# Patient Record
Sex: FEMALE | Race: WHITE | NOT HISPANIC OR LATINO | Employment: PART TIME | URBAN - METROPOLITAN AREA
[De-identification: names, ages, dates, MRNs, and addresses within clinical notes are randomized per-mention and may not be internally consistent; named-entity substitution may affect disease eponyms.]

---

## 2017-08-31 ENCOUNTER — OFFICE VISIT (OUTPATIENT)
Dept: OBSTETRICS AND GYNECOLOGY | Facility: CLINIC | Age: 52
End: 2017-08-31
Payer: COMMERCIAL

## 2017-08-31 VITALS
HEIGHT: 62 IN | SYSTOLIC BLOOD PRESSURE: 138 MMHG | WEIGHT: 151 LBS | DIASTOLIC BLOOD PRESSURE: 82 MMHG | BODY MASS INDEX: 27.79 KG/M2

## 2017-08-31 DIAGNOSIS — Z11.51 SPECIAL SCREENING EXAMINATION FOR HUMAN PAPILLOMAVIRUS (HPV): ICD-10-CM

## 2017-08-31 DIAGNOSIS — Z12.31 ENCOUNTER FOR SCREENING MAMMOGRAM FOR BREAST CANCER: Primary | ICD-10-CM

## 2017-08-31 DIAGNOSIS — Z12.4 SCREENING FOR MALIGNANT NEOPLASM OF THE CERVIX: ICD-10-CM

## 2017-08-31 DIAGNOSIS — Z01.419 ENCOUNTER FOR GYNECOLOGICAL EXAMINATION: ICD-10-CM

## 2017-08-31 DIAGNOSIS — N95.1 PERIMENOPAUSAL: ICD-10-CM

## 2017-08-31 PROCEDURE — 99396 PREV VISIT EST AGE 40-64: CPT | Mod: S$GLB,,, | Performed by: OBSTETRICS & GYNECOLOGY

## 2017-08-31 PROCEDURE — 87624 HPV HI-RISK TYP POOLED RSLT: CPT

## 2017-08-31 PROCEDURE — 99999 PR PBB SHADOW E&M-EST. PATIENT-LVL IV: CPT | Mod: PBBFAC,,, | Performed by: OBSTETRICS & GYNECOLOGY

## 2017-08-31 PROCEDURE — 88141 CYTOPATH C/V INTERPRET: CPT | Mod: ,,, | Performed by: PATHOLOGY

## 2017-08-31 PROCEDURE — 88175 CYTOPATH C/V AUTO FLUID REDO: CPT | Performed by: PATHOLOGY

## 2017-08-31 RX ORDER — ERGOCALCIFEROL 1.25 MG/1
50000 CAPSULE ORAL
COMMUNITY
End: 2018-11-28

## 2017-08-31 RX ORDER — ALLOPURINOL 100 MG/1
TABLET ORAL
Refills: 3 | COMMUNITY
Start: 2017-06-28 | End: 2018-11-28

## 2017-08-31 RX ORDER — PYRIDOXINE HCL (VITAMIN B6) 100 MG
TABLET ORAL
COMMUNITY
Start: 2015-12-28

## 2017-08-31 RX ORDER — HYDROCHLOROTHIAZIDE 25 MG/1
TABLET ORAL
COMMUNITY
Start: 2015-12-28 | End: 2018-11-28

## 2017-09-01 NOTE — PROGRESS NOTES
CC: Well woman exam    Parris Figueroa is a 52 y.o. female  presents for a well woman exam.  She is established.  LMP: Patient's last menstrual period was 2017 (exact date)..   Annual Exam -- Last Pap/Hpv 14,   Negative -- Last MMG 12-7-15,   Normal -- Colonoscopy , Never   Menstrual cycles are regular every 1-3 months.  Patient denies breakthrough bleeding.  Shortest cycle has been approximately 23 days.  Longest cycle has been 3 months.  Denies hot flashes and night sweats.  Contraception condoms    Health Maintenance   Topic Date Due    Lipid Panel  1967    TETANUS VACCINE  1985    Pap Smear with HPV Cotest  1988    Mammogram  2007    Colonoscopy  2017    Influenza Vaccine  2017         Past Medical History:   Diagnosis Date    Anemia 2016    Kidney stone     Uric Acid kidney stones    Seasonal allergies        Past Surgical History:   Procedure Laterality Date    CERVICAL DISC ARTHROPLASTY   &     DILATION AND CURETTAGE OF UTERUS   &     Missed Ab    KIDNEY STONE SURGERY      1 inch x 1 inch Kidney stone    LITHOTRIPSY   &     URETERAL STENT PLACEMENT      Dr Blas       OB History    Para Term  AB Living   5 2 1 1 3 2   SAB TAB Ectopic Multiple Live Births   1   1   2      # Outcome Date GA Lbr Ellis/2nd Weight Sex Delivery Anes PTL Lv   5 Term 2007 38w0d  2.977 kg (6 lb 9 oz) F Vag-Spont EPI  ROSS   4  2003 34w0d  2.296 kg (5 lb 1 oz) F Vag-Spont EPI  ROSS   3 Ectopic 2001 7w0d             Birth Comments: Tubal Preg   2 SAB 2001 12w0d       FD   1 AB  12w0d       FD      Birth Comments: D&C Done          Family History   Problem Relation Age of Onset    No Known Problems Father     Cancer Mother      Pre- Cervical Cancer (Hyst done)    Nephrolithiasis Paternal Grandfather     Breast cancer Neg Hx     Colon cancer Neg Hx     Ovarian cancer Neg Hx        Social History  "  Substance Use Topics    Smoking status: Never Smoker    Smokeless tobacco: Never Used    Alcohol use No       /82   Ht 5' 2" (1.575 m)   Wt 68.5 kg (151 lb 0.2 oz)   LMP 08/25/2017 (Exact Date)   BMI 27.62 kg/m²       ROS:  GENERAL: Denies weight gain or weight loss. Feeling well overall.   SKIN: Denies rash or lesions.   HEAD: Denies head injury or headache.   NODES: Denies enlarged lymph nodes.   CHEST: Denies chest pain or shortness of breath.   CARDIOVASCULAR: Denies palpitations or left sided chest pain.   ABDOMEN: No abdominal pain, constipation, diarrhea, nausea, vomiting or rectal bleeding.   URINARY: No frequency, dysuria, hematuria, or burning on urination.  REPRODUCTIVE: See HPI.   BREASTS: The patient performs breast self-examination and denies pain, lumps, or nipple discharge.   HEMATOLOGIC: No easy bruisability or excessive bleeding.  MUSCULOSKELETAL: Denies joint pain or swelling.   NEUROLOGIC: Denies syncope or weakness.   PSYCHIATRIC: Denies depression, anxiety or mood swings.    Physical Exam:    APPEARANCE: Well nourished, well developed, in no acute distress.  AFFECT: WNL, alert and oriented x 3  SKIN: No acne or hirsutism  ABDOMEN: Soft.  No tenderness or masses.  No hepatosplenomegaly.  No hernias.  BREASTS: Symmetrical, no skin changes or visible lesions.  No palpable masses, nipple discharge bilaterally.  PELVIC: Normal external genitalia without lesions.  Normal hair distribution.  Adequate perineal body, normal urethral meatus.  Vagina moist and well rugated without lesions or discharge.  Cervix pink, without lesions, discharge or tenderness.  No significant cystocele or rectocele.  Bimanual exam shows uterus to be normal size, regular, mobile and nontender.  Adnexa without masses or tenderness.    EXTREMITIES: No edema.    ASSESSMENT AND PLAN  1. Encounter for screening mammogram for breast cancer  Mammo Digital Screening Bilat with Tomosynthesis CAD    Mammo Digital " Screening Bilat with Tomosynthesis CAD   2. Screening for malignant neoplasm of the cervix  Liquid-based pap smear, screening   3. Special screening examination for human papillomavirus (HPV)  HPV High Risk Genotypes, PCR   4. Perimenopausal   has now started with irregular cycles every 1-3 months.  Patient instructed that if she has cycles shorter than every 2 weeks to be sure to let me know.  She denies hot flashes and night sweats and symptoms of menopause at this time.  Patient will continue to watch and keep track of irregular bleeding and menopausal symptoms.              instructed to keep a menstrual calendar  Encourage patient to get colonoscopy.  Referred to Dr. Cuevas  Patient was counseled today on A.C.S. Pap guidelines and recommendations for yearly pelvic exams, mammograms and monthly self breast exams; to see her PCP for other health maintenance.     No Follow-up on file.

## 2017-09-07 LAB
HPV HR 12 DNA CVX QL NAA+PROBE: NEGATIVE
HPV16 DNA SPEC QL NAA+PROBE: NEGATIVE
HPV18 DNA SPEC QL NAA+PROBE: NEGATIVE

## 2017-09-08 NOTE — PROGRESS NOTES
Please call pt and sched a repeat pap with Ladi or Sarah ( make a note n/c for visit)    Parris,   Your pap came back with menstrual blood and not enough cells to fully evaluate the pap and call it NORMAL.   Your HPV test is normal/ negative so I am not worried.  I would recommend at your convenience to run over to our office and have our nurse practioner repeat your pap. You will not have a charge for the office visit but the lab will charge you for the pap.  Please call the office and schedule a quick repeat pap.   Take care,   Travon Barber

## 2017-10-06 ENCOUNTER — TELEPHONE (OUTPATIENT)
Dept: OBSTETRICS AND GYNECOLOGY | Facility: CLINIC | Age: 52
End: 2017-10-06

## 2017-10-06 NOTE — TELEPHONE ENCOUNTER
Pt. Will call back when she has her calendar to schedule repeat pap for insufficient pap with a NP. This visit will be NC per

## 2018-11-15 ENCOUNTER — TELEPHONE (OUTPATIENT)
Dept: OBSTETRICS AND GYNECOLOGY | Facility: CLINIC | Age: 53
End: 2018-11-15

## 2018-11-15 DIAGNOSIS — Z12.31 ENCOUNTER FOR SCREENING MAMMOGRAM FOR BREAST CANCER: Primary | ICD-10-CM

## 2018-11-15 DIAGNOSIS — Z12.31 VISIT FOR SCREENING MAMMOGRAM: Primary | ICD-10-CM

## 2018-11-15 NOTE — TELEPHONE ENCOUNTER
Pt has annual scheduled on 11/28 with Ladi and would like mammo orders faxed to [facility]llie. Also, pt would like to know if she can get her pap smear and any other labs to be sent to either [facility] or [facility] so that insurance will cover it.

## 2018-11-28 ENCOUNTER — OFFICE VISIT (OUTPATIENT)
Dept: OBSTETRICS AND GYNECOLOGY | Facility: CLINIC | Age: 53
End: 2018-11-28
Payer: COMMERCIAL

## 2018-11-28 VITALS
DIASTOLIC BLOOD PRESSURE: 80 MMHG | WEIGHT: 140.13 LBS | BODY MASS INDEX: 25.79 KG/M2 | HEIGHT: 62 IN | SYSTOLIC BLOOD PRESSURE: 124 MMHG

## 2018-11-28 DIAGNOSIS — Z11.51 SCREENING FOR HUMAN PAPILLOMAVIRUS: ICD-10-CM

## 2018-11-28 DIAGNOSIS — Z12.4 SCREENING FOR MALIGNANT NEOPLASM OF CERVIX: ICD-10-CM

## 2018-11-28 DIAGNOSIS — Z01.419 ENCOUNTER FOR GYNECOLOGICAL EXAMINATION: Primary | ICD-10-CM

## 2018-11-28 PROCEDURE — 99396 PREV VISIT EST AGE 40-64: CPT | Mod: S$GLB,,, | Performed by: NURSE PRACTITIONER

## 2018-11-28 PROCEDURE — 99999 PR PBB SHADOW E&M-EST. PATIENT-LVL IV: CPT | Mod: PBBFAC,,, | Performed by: NURSE PRACTITIONER

## 2018-11-28 RX ORDER — ERGOCALCIFEROL 1.25 MG/1
50000 CAPSULE ORAL
COMMUNITY

## 2018-11-28 RX ORDER — ATORVASTATIN CALCIUM 40 MG/1
TABLET, FILM COATED ORAL
Refills: 6 | COMMUNITY
Start: 2018-10-22 | End: 2022-08-01

## 2018-11-28 RX ORDER — HYDROCHLOROTHIAZIDE 25 MG/1
12.5 TABLET ORAL DAILY
COMMUNITY

## 2018-11-28 RX ORDER — FEBUXOSTAT 40 MG/1
40 TABLET, FILM COATED ORAL DAILY
COMMUNITY
End: 2022-08-01

## 2018-11-29 NOTE — PROGRESS NOTES
"Chief Complaint: Well Woman Exam     (Dr. Barber patient)    Last Pap:  2017 Insufficient cells,  HPV negative  Last Mammo:  17 at [facility] (nml per pt) - she has mammo scheduled there next week  Last DEXA:  n/a  Last Colonoscopy:  2017 (had 1 polyp removed (benign) - due again in 4 yrs)    * Also states she has an Annual wellness exam scheduled with her PCP on 18.    HPI:      Parris Figueroa is a 53 y.o.  who presents today for well woman exam.      LMP: Patient's last menstrual period was 2018 (exact date).     Patient denies abnormal vaginal bleeding, discharge, pelvic pain, urinary problems, or changes in appetite.       She reports occasional mild hot flashes, but states they are not bothersome enough that she wants to take medicine for them.  She was prescribed low dose OCP's after her last annual to help with perimenopausal s/s, but stopped after 2 weeks due to them "making her crazy".  Even her , 15, and 11 year old daughters wanted her to stop them immediately because of the drastic change in her mood & personality.    Ms. Figueroa is currently sexually active with a single male partner.  She declines STD screening today.    Past Medical History:   Diagnosis Date    Anemia 2016    Kidney stone     Uric Acid kidney stones    Seasonal allergies      Past Surgical History:   Procedure Laterality Date    CERVICAL DISC ARTHROPLASTY   &     COLONOSCOPY  2017    polyp  (Rtn 4yrs)     DILATION AND CURETTAGE OF UTERUS   &     Missed Ab    KIDNEY STONE SURGERY      1 inch x 1 inch Kidney stone    LITHOTRIPSY   &     URETERAL STENT PLACEMENT      Dr Blas     OB History      Para Term  AB Living    5 2 1 1 3 2    SAB TAB Ectopic Multiple Live Births    2   1   2          ROS:     GENERAL: Denies unintentional weight gain or weight loss. Feeling well overall.   SKIN: Denies rash or lesions.   HEENT: Denies headaches, " "or vision changes.   CARDIOVASCULAR: Denies palpitations or chest pain.   RESPIRATORY: Denies shortness of breath or dyspnea on exertion.  BREASTS: The patient performs breast self-examination and denies pain, lumps, or nipple discharge.   ABDOMEN: Denies abdominal pain, constipation, diarrhea, nausea, vomiting, change in appetite, or rectal bleeding.  URINARY: Denies frequency, dysuria, hematuria.  REPRODUCTIVE:  See HPI.  NEUROLOGIC: Denies syncope or weakness.   PSYCHIATRIC: Denies depression, anxiety or mood swings.    Physical Exam:     PHYSICAL EXAM:  /80   Ht 5' 2" (1.575 m)   Wt 63.6 kg (140 lb 1.6 oz)   LMP 04/02/2018 (Exact Date)   BMI 25.63 kg/m²   Body mass index is 25.63 kg/m².     APPEARANCE: Well nourished, well developed, in no acute distress.  PSYCH: Appropriate mood and affect.  SKIN: No acne or hirsutism.  NECK:  Neck symmetric without masses or thyromegaly  NODES:  No inguinal, axillary, or supraclavicular lymph node enlargement  CHEST:  Normal respiratory effort.  ABDOMEN:  Soft.  No tenderness or masses.  No distention. No hernias palpated. No CVA tenderness.  BREASTS:  Symmetrical, no skin changes or visible lesions.  No palpable masses or nipple discharge bilaterally.  VULVA:  No lesions. Normal external female genitalia.  URETHRAL MEATUS:  Normal size and location, no lesions, no prolapse.  URETHRA:  No masses, tenderness, or prolapse.  VAGINA:  Moist. No lesions or lacerations noted. No abnormal discharge present. No odor present.   CERVIX:  No lesions or discharge. No cervical motion tenderness. Somewhat friable with pap spatula & brush.  UTERUS:  Normal size, regular shape, mobile, non-tender.  ADNEXA:  No tenderness. No fullness or masses palpated in the adnexal regions.   ANUS PERINEUM:  Normal.      Assessment/Plan:     Encounter for gynecological examination    Screening for malignant neoplasm of cervix  -     Liquid-based pap smear, screening  -     Liquid-based pap smear, " screening    Screening for human papillomavirus  -     Cancel: HPV High Risk Genotypes, PCR  -     HPV DNA (High Risk)    * PAP WENT TO [facility]*    Counseling:     Patient was counseled today on current ASCCP pap guidelines, the recommendation for yearly pelvic exams, healthy diet and exercise routines, annual mammograms starting at age 40, and breast self awareness. She is to see her PCP for other health maintenance.  Counseling session lasted approximately 10 minutes, and all her questions were answered.    * Use of the [facility] Patient Portal discussed and encouraged during today's visit.     * Patient aware that she will be notified once her finalized results have been reviewed by her Provider, either via her [facility] patient portal, or via telephone call.    Follow-up:  in 1 year for routine well woman exam.

## 2018-12-02 LAB
CYTOLOGIST CVX/VAG CYTO: NORMAL
DX ICD CODE: NORMAL
HPV I/H RISK 1 DNA CVX QL PROBE+SIG AMP: NEGATIVE
Lab: NORMAL
OTHER STN SPEC: NORMAL
PATH REPORT.FINAL DX SPEC: NORMAL
STAT OF ADQ CVX/VAG CYTO-IMP: NORMAL

## 2018-12-04 NOTE — PROGRESS NOTES
Good news Parris!    Your pap smear came back and it was normal.  I also tested it for HPV - Human Papilloma Virus - and that came back normal also.  The new recommendations are to check everyone over the age of 30 for HPV.  Because both of these came back negative, you have a very low risk for developing cervical cancer.  Based on current guidelines you don't need another pap smear for 3 years.   We do still recommend that you come back to clinic EACH YEAR for your annual exam. That gives us a chance to make sure you're doing well and no problems have developed since we saw you last.     Let me know if you have any questions,  NELSON Dodd

## 2018-12-18 ENCOUNTER — TELEPHONE (OUTPATIENT)
Dept: OBSTETRICS AND GYNECOLOGY | Facility: CLINIC | Age: 53
End: 2018-12-18

## 2018-12-18 DIAGNOSIS — R92.8 ABNORMAL MAMMOGRAM: Primary | ICD-10-CM

## 2018-12-18 NOTE — TELEPHONE ENCOUNTER
Robert with [facility] calling for order for bilateral breast u/s for follow up to abnormal screening mammo faxed to [phone].

## 2018-12-19 ENCOUNTER — TELEPHONE (OUTPATIENT)
Dept: OBSTETRICS AND GYNECOLOGY | Facility: CLINIC | Age: 53
End: 2018-12-19

## 2018-12-19 NOTE — TELEPHONE ENCOUNTER
Spoke to patient regarding mammogram results.  Results from [facility].  Apparent developing ductal prominence in the right breast.  No suspicious morphologic abnormalities and no suspicious calcifications they are requesting a breast ultrasound for further clarification.  Patient informed and is going in the morning for breast ultrasound with Dr. Perkins

## 2019-05-30 ENCOUNTER — TELEPHONE (OUTPATIENT)
Dept: OBSTETRICS AND GYNECOLOGY | Facility: CLINIC | Age: 54
End: 2019-05-30

## 2019-05-30 DIAGNOSIS — R92.8 ABNORMAL MAMMOGRAM: Primary | ICD-10-CM

## 2019-05-30 NOTE — TELEPHONE ENCOUNTER
Pt needs order for follow up bilateral diagnostic breast mammo and u/s. Pt has appt at [facility] scheduled 06/20/19.

## 2019-12-11 ENCOUNTER — TELEPHONE (OUTPATIENT)
Dept: OBSTETRICS AND GYNECOLOGY | Facility: CLINIC | Age: 54
End: 2019-12-11

## 2019-12-11 DIAGNOSIS — R92.8 ABNORMAL MAMMOGRAM: Primary | ICD-10-CM

## 2019-12-11 NOTE — TELEPHONE ENCOUNTER
Dr. Barber-- Cinthia from [facility] calling, states that patient is coming in for a 3D diagnostic bilateral mammo and bilateral breast u/s at 8 AM tomorrow. Please fax orders to [phone]

## 2019-12-12 ENCOUNTER — OFFICE VISIT (OUTPATIENT)
Dept: UROLOGY | Facility: CLINIC | Age: 54
End: 2019-12-12
Payer: COMMERCIAL

## 2019-12-12 VITALS
DIASTOLIC BLOOD PRESSURE: 86 MMHG | WEIGHT: 145 LBS | BODY MASS INDEX: 26.68 KG/M2 | HEART RATE: 96 BPM | SYSTOLIC BLOOD PRESSURE: 137 MMHG | HEIGHT: 62 IN

## 2019-12-12 DIAGNOSIS — N13.2 URETERAL STONE WITH HYDRONEPHROSIS: ICD-10-CM

## 2019-12-12 DIAGNOSIS — N20.0 NEPHROLITHIASIS: Primary | ICD-10-CM

## 2019-12-12 DIAGNOSIS — R10.9 FLANK PAIN: ICD-10-CM

## 2019-12-12 LAB
BILIRUB SERPL-MCNC: ABNORMAL MG/DL
BLOOD URINE, POC: ABNORMAL
COLOR, POC UA: YELLOW
GLUCOSE UR QL STRIP: ABNORMAL
KETONES UR QL STRIP: ABNORMAL
LEUKOCYTE ESTERASE URINE, POC: ABNORMAL
NITRITE, POC UA: ABNORMAL
PH, POC UA: 5
POC RESIDUAL URINE VOLUME: 27 ML (ref 0–100)
PROTEIN, POC: ABNORMAL
SPECIFIC GRAVITY, POC UA: 1.01
UROBILINOGEN, POC UA: ABNORMAL

## 2019-12-12 PROCEDURE — 51798 US URINE CAPACITY MEASURE: CPT | Mod: S$GLB,,, | Performed by: NURSE PRACTITIONER

## 2019-12-12 PROCEDURE — 99213 OFFICE O/P EST LOW 20 MIN: CPT | Mod: 25,S$GLB,, | Performed by: NURSE PRACTITIONER

## 2019-12-12 PROCEDURE — 81002 POCT URINE DIPSTICK WITHOUT MICROSCOPE: ICD-10-PCS | Mod: S$GLB,,, | Performed by: NURSE PRACTITIONER

## 2019-12-12 PROCEDURE — 81002 URINALYSIS NONAUTO W/O SCOPE: CPT | Mod: S$GLB,,, | Performed by: NURSE PRACTITIONER

## 2019-12-12 PROCEDURE — 99213 PR OFFICE/OUTPT VISIT, EST, LEVL III, 20-29 MIN: ICD-10-PCS | Mod: 25,S$GLB,, | Performed by: NURSE PRACTITIONER

## 2019-12-12 PROCEDURE — 51798 POCT BLADDER SCAN: ICD-10-PCS | Mod: S$GLB,,, | Performed by: NURSE PRACTITIONER

## 2019-12-12 RX ORDER — SULFAMETHOXAZOLE AND TRIMETHOPRIM 800; 160 MG/1; MG/1
1 TABLET ORAL 2 TIMES DAILY
Qty: 14 TABLET | Refills: 0 | Status: SHIPPED | OUTPATIENT
Start: 2019-12-12 | End: 2019-12-19

## 2019-12-12 NOTE — PROGRESS NOTES
"Subjective:      Parris Figueroa is a 54 y.o. female who returns today regarding her flank pain. She is an established patient of Dr. Vasquez and is new to me today.    The patient has an extensive history of nephrolithiasis. Most recently she is s/p ureteroscopy for a large UPJ stone in 2016.     She presents today reporting severe left flank pain that began yesterday. She describes the pain as "stabbing" and mostly in her back but the pain does radiate somewhat to her abdomen. Denies dysuria, frequency and urgency. + nausea and vomiting. Denies gross hematuria and fever/chills. Denies a history of recurrent UTI's.     The following portions of the patient's history were reviewed and updated as appropriate: allergies, current medications, past family history, past medical history, past social history, past surgical history and problem list.    Review of Systems  Constitutional: no fever or chills  ENT: no nasal congestion or sore throat  Respiratory: no cough or shortness of breath  Cardiovascular: no chest pain or palpitations  Gastrointestinal: positive for nausea and vomiting  Genitourinary: as per HPI  Hematologic/Lymphatic: no easy bruising or lymphadenopathy  Musculoskeletal: no arthralgias or myalgias  Neurological: no seizures or tremors  Behavioral/Psych: no auditory or visual hallucinations     Objective:   Vital Signs:  Vitals:    12/12/19 1335   BP: 137/86   Pulse: 96       Physical Exam   General: alert and oriented, no acute distress  Head: normocephalic, atraumatic  Neck: supple, no lymphadenopathy, normal ROM, no masses  Respiratory: Symmetric expansion, non-labored breathing  Cardiovascular: regular rate and rhythm, nomal pulses, no peripheral edema  Abdomen: soft, non tender, non distended, no palpable masses, no hernias, no hepatomegaly or splenomegaly  Pelvic: not examined   Lymphatic: no inguinal nodes  Skin: normal coloration and turgor, no rashes, no suspicious skin lesions " noted  Neuro: alert and oriented x3, no gross deficits  Psych: normal judgment and insight, normal mood/affect and non-anxious  +CVA tenderness, left    Lab Review   Urinalysis demonstrates positive for leukocytes (+)  PVR: 27 mL    No results found for: WBC, HGB, HCT, MCV, PLT  No results found for: CREATININE, BUN    Imaging  No recent imaging    Assessment:   Nephrolithiasis  Flank pain    Plan:   Parris was seen today for flank pain.    Diagnoses and all orders for this visit:    Nephrolithiasis    Flank pain  -     POCT URINE DIPSTICK WITHOUT MICROSCOPE  -     POCT Bladder Scan  -     Urine culture  -     CT Renal Stone Study ABD Pelvis WO; Future  -     CT Renal Stone Study ABD Pelvis WO  -     sulfamethoxazole-trimethoprim 800-160mg (BACTRIM DS) 800-160 mg Tab; Take 1 tablet by mouth 2 (two) times daily. for 7 days    Plan:  --STAT CT stone study- all imaging has to be completed at [facility] per her insurance  --Urine culture  --Start bactrim, may adjust based on culture results  --Will call with results and determine plan of care

## 2019-12-13 RX ORDER — ONDANSETRON 4 MG/1
4 TABLET, FILM COATED ORAL EVERY 6 HOURS PRN
Qty: 30 TABLET | Refills: 0 | Status: SHIPPED | OUTPATIENT
Start: 2019-12-13 | End: 2021-03-02

## 2019-12-13 RX ORDER — TAMSULOSIN HYDROCHLORIDE 0.4 MG/1
0.4 CAPSULE ORAL DAILY
Qty: 30 CAPSULE | Refills: 0 | Status: SHIPPED | OUTPATIENT
Start: 2019-12-13 | End: 2021-03-02

## 2019-12-13 RX ORDER — KETOROLAC TROMETHAMINE 10 MG/1
10 TABLET, FILM COATED ORAL EVERY 6 HOURS
Qty: 40 TABLET | Refills: 0 | Status: SHIPPED | OUTPATIENT
Start: 2019-12-13 | End: 2019-12-23

## 2019-12-13 RX ORDER — OXYCODONE AND ACETAMINOPHEN 5; 325 MG/1; MG/1
1 TABLET ORAL EVERY 4 HOURS PRN
Qty: 20 TABLET | Refills: 0 | Status: SHIPPED | OUTPATIENT
Start: 2019-12-13 | End: 2021-03-02

## 2019-12-18 ENCOUNTER — TELEPHONE (OUTPATIENT)
Dept: UROLOGY | Facility: CLINIC | Age: 54
End: 2019-12-18

## 2019-12-18 NOTE — TELEPHONE ENCOUNTER
----- Message from Yeni Young sent at 12/18/2019  9:00 AM CST -----  Contact: pt  Type:  Patient Returning Call    Who Called: Parris Figueroa    Who Left Message for Patient: Samara     Does the patient know what this is regarding?: Y     Best Call Back Number: [phone]    Additional Information:

## 2019-12-18 NOTE — TELEPHONE ENCOUNTER
----- Message from Samara Nicole RN sent at 12/18/2019 10:32 AM CST -----  Thanks, Samara    ----- Message -----  From: Samara Nicole RN  Sent: 12/18/2019   9:23 AM CST  To: Garry Blas MD  Subject: RE: Edit                                         I spoke to pt, she is no longer having pain.  She has US appt at [facility] on 12/30/19.  She will call Dr. Stroud's office and try to schedule appt in the event she needs it.        ----- Message -----  From: Garry Blas MD  Sent: 12/18/2019   6:48 AM CST  To: Mira Navarro, MARION, Samara Nicole RN  Subject: RE: Edit                                         Samara and Mira,    If she is still having pain, she needs a KUB.  She may need an ESWL or ureteroscopy.  I think she needs this done at [facility] due to insurance.  If so, I will refer her to Dr Stroud.    Let me know what's going on with her    Thanks!    Sc      ----- Message -----  From: Samara Nicole RN  Sent: 12/17/2019   3:15 PM CST  To: Garry Blas MD  Subject: Edit

## 2020-01-08 ENCOUNTER — TELEPHONE (OUTPATIENT)
Dept: UROLOGY | Facility: CLINIC | Age: 55
End: 2020-01-08

## 2020-01-08 NOTE — TELEPHONE ENCOUNTER
----- Message from Addi Hong sent at 1/8/2020 11:08 AM CST -----  Contact: Patient   Spoke with Ida at [facility]([phone]) She is faxing the US results today. Unfortunately due to the fax machine being down it make take me a little longer than usual to get them printed.     ----- Message -----  From: Mira Navarro NP  Sent: 1/8/2020  10:42 AM CST  To: Ramon Miguel Staff    Could we please obtain these results from [facility]? They are not under media. Thanks!    ----- Message -----  From: Dayanna Adams  Sent: 1/8/2020  10:34 AM CST  To: Ramon Meyers Staff    Type: Patient Call Back    Who called: Patient     What is the request in detail: Pt is requesting a call back in regards to her results  from the Ultrasound taken on 12/30/2019    Can the clinic reply by [facility]?    Would the patient rather a call back or a response via [facility]? Call back     Best call back number: [phone]

## 2020-01-08 NOTE — TELEPHONE ENCOUNTER
----- Message from Mira Navarro NP sent at 1/8/2020 10:49 AM CST -----  The CT is in media not the ultrasound.     ----- Message -----  From: Riya Quiñonez MA  Sent: 1/8/2020  10:42 AM CST  To: Mira Navarro NP    Patient results are in the media , please contact patient with results Thanks         Preferred Name:   Parris Figueroa  Female, 54 y.o., 1965  Phone:   [phone] (M)  PCP:   Travon Barber MD  Language:   English  Need Interp:   No  Allergies Last Reviewed:   12/12/19  Allergies:   Codeine,      Iodine And Iodide Containing Products,      Penicillin  Health Maintenance:   Due  Primary Ins.:   [employer] [facility]  MRN:   [id number]  Pt Comm Pref:   Patient Portal, Mail  Last MyChart Login:   1/7/2020 10:08 PM  Next Appt:   With Obstetrics and Gynecology  01/14/2020 at 9:20 AM  My Sticky Note:     Specialty Comments:     Message   Received: Today      Results    Pt Advice   Message Contents   Dayanna Meyers Staff  Caller: Patient (Today, 10:34 AM)         Type: Patient Call Back     Who called: Patient     What is the request in detail: Pt is requesting a call back in regards to her results  from the Ultrasound taken on 12/30/2019     Can the clinic reply by [facility]?     Would the patient rather a call back or a response via [facility]? Call back     Best call back number: [phone]

## 2020-01-08 NOTE — TELEPHONE ENCOUNTER
Spoke with the pt. Her kidney remains swollen indicating that the stone has not passed. She is following up with Dr. Stroud due to her insurance change.

## 2020-04-21 DIAGNOSIS — Z01.84 ANTIBODY RESPONSE EXAMINATION: ICD-10-CM

## 2020-04-29 ENCOUNTER — LAB VISIT (OUTPATIENT)
Dept: LAB | Facility: HOSPITAL | Age: 55
End: 2020-04-29
Attending: INTERNAL MEDICINE
Payer: COMMERCIAL

## 2020-04-29 DIAGNOSIS — Z01.84 ANTIBODY RESPONSE EXAMINATION: ICD-10-CM

## 2020-04-29 LAB — SARS-COV-2 IGG SERPLBLD QL IA.RAPID: NEGATIVE

## 2020-04-29 PROCEDURE — 86769 SARS-COV-2 COVID-19 ANTIBODY: CPT

## 2020-04-29 PROCEDURE — 36415 COLL VENOUS BLD VENIPUNCTURE: CPT | Mod: PN

## 2021-01-05 ENCOUNTER — TELEPHONE (OUTPATIENT)
Dept: OBSTETRICS AND GYNECOLOGY | Facility: CLINIC | Age: 56
End: 2021-01-05

## 2021-01-05 DIAGNOSIS — R92.8 ABNORMAL MAMMOGRAM: Primary | ICD-10-CM

## 2021-03-02 ENCOUNTER — OFFICE VISIT (OUTPATIENT)
Dept: OBSTETRICS AND GYNECOLOGY | Facility: CLINIC | Age: 56
End: 2021-03-02
Attending: OBSTETRICS & GYNECOLOGY
Payer: COMMERCIAL

## 2021-03-02 VITALS
TEMPERATURE: 97 F | DIASTOLIC BLOOD PRESSURE: 84 MMHG | SYSTOLIC BLOOD PRESSURE: 132 MMHG | HEIGHT: 62 IN | WEIGHT: 147.69 LBS | BODY MASS INDEX: 27.18 KG/M2

## 2021-03-02 DIAGNOSIS — Z01.419 ENCOUNTER FOR GYNECOLOGICAL EXAMINATION: Primary | ICD-10-CM

## 2021-03-02 PROCEDURE — 99999 PR PBB SHADOW E&M-EST. PATIENT-LVL IV: ICD-10-PCS | Mod: PBBFAC,,, | Performed by: OBSTETRICS & GYNECOLOGY

## 2021-03-02 PROCEDURE — 99999 PR PBB SHADOW E&M-EST. PATIENT-LVL IV: CPT | Mod: PBBFAC,,, | Performed by: OBSTETRICS & GYNECOLOGY

## 2021-03-02 PROCEDURE — 99396 PR PREVENTIVE VISIT,EST,40-64: ICD-10-PCS | Mod: S$GLB,,, | Performed by: OBSTETRICS & GYNECOLOGY

## 2021-03-02 PROCEDURE — 99396 PREV VISIT EST AGE 40-64: CPT | Mod: S$GLB,,, | Performed by: OBSTETRICS & GYNECOLOGY

## 2021-03-02 RX ORDER — METFORMIN HYDROCHLORIDE 1000 MG/1
TABLET ORAL
COMMUNITY
Start: 2020-11-09 | End: 2022-08-01

## 2021-03-02 RX ORDER — LISINOPRIL 5 MG/1
5 TABLET ORAL DAILY
COMMUNITY
Start: 2021-02-08 | End: 2022-08-01

## 2021-07-29 ENCOUNTER — IMMUNIZATION (OUTPATIENT)
Dept: OBSTETRICS AND GYNECOLOGY | Facility: CLINIC | Age: 56
End: 2021-07-29
Payer: COMMERCIAL

## 2021-07-29 DIAGNOSIS — Z23 NEED FOR VACCINATION: Primary | ICD-10-CM

## 2021-07-29 PROCEDURE — 91300 COVID-19, MRNA, LNP-S, PF, 30 MCG/0.3 ML DOSE VACCINE: CPT | Mod: PBBFAC | Performed by: FAMILY MEDICINE

## 2021-08-21 ENCOUNTER — IMMUNIZATION (OUTPATIENT)
Dept: OBSTETRICS AND GYNECOLOGY | Facility: CLINIC | Age: 56
End: 2021-08-21
Payer: COMMERCIAL

## 2021-08-21 DIAGNOSIS — Z23 NEED FOR VACCINATION: Primary | ICD-10-CM

## 2021-08-21 PROCEDURE — 0002A COVID-19, MRNA, LNP-S, PF, 30 MCG/0.3 ML DOSE VACCINE: CPT | Mod: CV19,,, | Performed by: FAMILY MEDICINE

## 2021-08-21 PROCEDURE — 0002A COVID-19, MRNA, LNP-S, PF, 30 MCG/0.3 ML DOSE VACCINE: ICD-10-PCS | Mod: CV19,,, | Performed by: FAMILY MEDICINE

## 2021-08-21 PROCEDURE — 91300 COVID-19, MRNA, LNP-S, PF, 30 MCG/0.3 ML DOSE VACCINE: ICD-10-PCS | Mod: ,,, | Performed by: FAMILY MEDICINE

## 2021-08-21 PROCEDURE — 91300 COVID-19, MRNA, LNP-S, PF, 30 MCG/0.3 ML DOSE VACCINE: CPT | Mod: ,,, | Performed by: FAMILY MEDICINE

## 2021-09-23 ENCOUNTER — TELEPHONE (OUTPATIENT)
Dept: SPORTS MEDICINE | Facility: CLINIC | Age: 56
End: 2021-09-23

## 2021-09-23 DIAGNOSIS — Z20.822 EXPOSURE TO COVID-19 VIRUS: Primary | ICD-10-CM

## 2021-09-24 ENCOUNTER — LAB VISIT (OUTPATIENT)
Dept: SURGERY | Facility: CLINIC | Age: 56
End: 2021-09-24

## 2021-09-24 DIAGNOSIS — Z20.822 EXPOSURE TO COVID-19 VIRUS: ICD-10-CM

## 2021-09-24 LAB — SARS-COV-2 RNA RESP QL NAA+PROBE: NOT DETECTED

## 2021-09-24 PROCEDURE — U0003 INFECTIOUS AGENT DETECTION BY NUCLEIC ACID (DNA OR RNA); SEVERE ACUTE RESPIRATORY SYNDROME CORONAVIRUS 2 (SARS-COV-2) (CORONAVIRUS DISEASE [COVID-19]), AMPLIFIED PROBE TECHNIQUE, MAKING USE OF HIGH THROUGHPUT TECHNOLOGIES AS DESCRIBED BY CMS-2020-01-R: HCPCS | Performed by: FAMILY MEDICINE

## 2022-04-19 ENCOUNTER — TELEPHONE (OUTPATIENT)
Dept: OBSTETRICS AND GYNECOLOGY | Facility: CLINIC | Age: 57
End: 2022-04-19
Payer: COMMERCIAL

## 2022-04-19 DIAGNOSIS — Z12.31 BREAST CANCER SCREENING BY MAMMOGRAM: Primary | ICD-10-CM

## 2022-04-19 NOTE — TELEPHONE ENCOUNTER
Spoke with pt & scheduled annual appt with  for 8-1-22.  Also needs mammogram orders faxed to [facility].

## 2022-07-12 DIAGNOSIS — R50.9 FEVER, UNSPECIFIED FEVER CAUSE: Primary | ICD-10-CM

## 2022-07-12 LAB
CTP QC/QA: YES
SARS-COV-2 AG RESP QL IA.RAPID: POSITIVE

## 2022-08-01 ENCOUNTER — OFFICE VISIT (OUTPATIENT)
Dept: OBSTETRICS AND GYNECOLOGY | Facility: CLINIC | Age: 57
End: 2022-08-01
Attending: OBSTETRICS & GYNECOLOGY
Payer: COMMERCIAL

## 2022-08-01 VITALS
WEIGHT: 157.19 LBS | BODY MASS INDEX: 28.93 KG/M2 | DIASTOLIC BLOOD PRESSURE: 80 MMHG | HEIGHT: 62 IN | SYSTOLIC BLOOD PRESSURE: 130 MMHG

## 2022-08-01 DIAGNOSIS — Z12.4 ENCOUNTER FOR PAPANICOLAOU SMEAR FOR CERVICAL CANCER SCREENING: ICD-10-CM

## 2022-08-01 DIAGNOSIS — Z12.31 VISIT FOR SCREENING MAMMOGRAM: ICD-10-CM

## 2022-08-01 DIAGNOSIS — Z11.51 ENCOUNTER FOR SCREENING FOR HUMAN PAPILLOMAVIRUS (HPV): ICD-10-CM

## 2022-08-01 DIAGNOSIS — Z01.419 ENCOUNTER FOR GYNECOLOGICAL EXAMINATION: Primary | ICD-10-CM

## 2022-08-01 PROCEDURE — 3079F DIAST BP 80-89 MM HG: CPT | Mod: CPTII,S$GLB,, | Performed by: OBSTETRICS & GYNECOLOGY

## 2022-08-01 PROCEDURE — 99396 PREV VISIT EST AGE 40-64: CPT | Mod: S$GLB,,, | Performed by: OBSTETRICS & GYNECOLOGY

## 2022-08-01 PROCEDURE — 99999 PR PBB SHADOW E&M-EST. PATIENT-LVL III: CPT | Mod: PBBFAC,,, | Performed by: OBSTETRICS & GYNECOLOGY

## 2022-08-01 PROCEDURE — 3075F PR MOST RECENT SYSTOLIC BLOOD PRESS GE 130-139MM HG: ICD-10-PCS | Mod: CPTII,S$GLB,, | Performed by: OBSTETRICS & GYNECOLOGY

## 2022-08-01 PROCEDURE — 3075F SYST BP GE 130 - 139MM HG: CPT | Mod: CPTII,S$GLB,, | Performed by: OBSTETRICS & GYNECOLOGY

## 2022-08-01 PROCEDURE — 3008F BODY MASS INDEX DOCD: CPT | Mod: CPTII,S$GLB,, | Performed by: OBSTETRICS & GYNECOLOGY

## 2022-08-01 PROCEDURE — 3008F PR BODY MASS INDEX (BMI) DOCUMENTED: ICD-10-PCS | Mod: CPTII,S$GLB,, | Performed by: OBSTETRICS & GYNECOLOGY

## 2022-08-01 PROCEDURE — 99999 PR PBB SHADOW E&M-EST. PATIENT-LVL III: ICD-10-PCS | Mod: PBBFAC,,, | Performed by: OBSTETRICS & GYNECOLOGY

## 2022-08-01 PROCEDURE — 99396 PR PREVENTIVE VISIT,EST,40-64: ICD-10-PCS | Mod: S$GLB,,, | Performed by: OBSTETRICS & GYNECOLOGY

## 2022-08-01 PROCEDURE — 3079F PR MOST RECENT DIASTOLIC BLOOD PRESSURE 80-89 MM HG: ICD-10-PCS | Mod: CPTII,S$GLB,, | Performed by: OBSTETRICS & GYNECOLOGY

## 2022-08-01 RX ORDER — ATORVASTATIN CALCIUM 40 MG/1
40 TABLET, FILM COATED ORAL
COMMUNITY
Start: 2021-11-23

## 2022-08-01 RX ORDER — METFORMIN HYDROCHLORIDE 500 MG/1
1000 TABLET, EXTENDED RELEASE ORAL DAILY
COMMUNITY
Start: 2022-06-20

## 2022-08-01 NOTE — PROGRESS NOTES
LMP: Patient's last menstrual period was 2018 (exact date)..    Contraception: The current method of family planning is post menopausal status.      Last Pap: 18-negative  HPV negative  Last MM21  Birads 2  Facility:[facility]  Last Colonoscopy: 2017

## 2022-08-02 ENCOUNTER — TELEPHONE (OUTPATIENT)
Dept: OBSTETRICS AND GYNECOLOGY | Facility: CLINIC | Age: 57
End: 2022-08-02
Payer: COMMERCIAL

## 2022-08-02 DIAGNOSIS — Z12.31 BREAST CANCER SCREENING BY MAMMOGRAM: Primary | ICD-10-CM

## 2022-08-02 NOTE — PROGRESS NOTES
Chief Complaint: well woman exam  Well Woman    She is established    Parrsi Figueroa is a 57 y.o. female  presents for a well woman exam.    No gyn c/o     ROS:  No abdominal pain. No vaginal discharge  No breast pain or masses, No rectal bleeding     LMP: Patient's last menstrual period was 2018 (exact date)..    Contraception: The current method of family planning is post menopausal status.        Last Pap: 18-negative  HPV negative  Last MM21  Birads 2  Facility:[facility]  Last Colonoscopy: 2017 at [facility]  benign polyp repeat 5 yrs - now   Pt will most likely see Marycarmen now that [facility]    Past Medical History:   Diagnosis Date    Anemia     Diabetes mellitus     on Metformin  dx     Fatty liver     Infertility, female     Kidney stone     Uric Acid kidney stones    Seasonal allergies        Past Surgical History:   Procedure Laterality Date    CERVICAL DISC ARTHROPLASTY   &     COLONOSCOPY  2017    polyp  (Rtn 4yrs)     DILATION AND CURETTAGE OF UTERUS   &     Missed Ab    KIDNEY STONE SURGERY      1 inch x 1 inch Kidney stone    LITHOTRIPSY   &     URETERAL STENT PLACEMENT      Dr Blas       OB History    Para Term  AB Living   5 2 1 1 3 2   SAB IAB Ectopic Multiple Live Births   2   1   2      # Outcome Date GA Lbr Ellis/2nd Weight Sex Delivery Anes PTL Lv   5 Term 2007 38w0d  2.977 kg (6 lb 9 oz) F Vag-Spont EPI  ROSS   4  2003 34w0d  2.296 kg (5 lb 1 oz) F Vag-Spont EPI  ROSS   3 Ectopic 2001 7w0d             Birth Comments: Tubal Preg   2 SAB 2001 12w0d       FD   1 SAB  12w0d    SAB   FD      Birth Comments: D&C Done       Family History   Problem Relation Age of Onset    No Known Problems Father     Cancer Mother         pre cancerous cells on cervix    Hyperlipidemia Mother     Cervical cancer Mother     Nephrolithiasis Paternal Grandfather     Diabetes Sister     Hypertension  "Sister     Hyperlipidemia Sister     Diabetes Maternal Grandmother         Type 2 insulin dependant    Hyperlipidemia Maternal Grandmother     Hyperlipidemia Sister     Hypertension Sister     Breast cancer Neg Hx     Colon cancer Neg Hx     Ovarian cancer Neg Hx        Social History     Tobacco Use    Smoking status: Never Smoker    Smokeless tobacco: Never Used   Substance Use Topics    Alcohol use: No    Drug use: No       Physical Exam:  /80   Ht 5' 2" (1.575 m)   Wt 71.3 kg (157 lb 3 oz)   LMP 04/02/2018 (Exact Date)   BMI 28.75 kg/m²     APPEARANCE: Well nourished, well developed, in no acute distress.  AFFECT: WNL, alert and oriented x 3  SKIN: No acne or hirsutism  BREASTS: Symmetrical, no skin changes.                      No nipple discharge.   No palpable masses bilaterally  NODES: No inguinal nor axillary LAD  ABDOMEN: soft Non tender Non distended No masses  PELVIC:   Normal external genitalia without lesions.  Normal hair distribution.   Adequate perineal body, normal urethral meatus.   No signif cystocele or rectocele.  Vagina moist and well rugated without lesions or discharge.    Cervix pink, without lesions, discharge or tenderness.     PAP done  Bimanual exam shows uterus to be normal size, regular, mobile and nontender.    Adnexa without masses or tenderness.    EXTREMITIES: No edema.        ASSESSMENT AND PLAN  Parris was seen today for well woman.    Diagnoses and all orders for this visit:    Visit for screening mammogram  -     Mammo Digital Screening Bilat w/ Eyal; Future  -     Mammo Digital Screening Bilat w/ Eyal    Encounter for Papanicolaou smear for cervical cancer screening  -     Liquid-Based Pap Smear, Screening    Encounter for screening for human papillomavirus (HPV)  -     HPV High Risk Genotypes, PCR          No follow-ups on file.     Patient was counseled today on A.C.S. Pap guidelines and recommendations for yearly pelvic exams, mammograms and monthly self " breast exams; to see her PCP for other health maintenance.     Patient encouraged to register for portal and results will be sent via portal.       Health Maintenance   Topic Date Due    Hepatitis C Screening  Never done    Lipid Panel  Never done    TETANUS VACCINE  Never done    Mammogram  02/01/2022

## 2022-08-03 ENCOUNTER — TELEPHONE (OUTPATIENT)
Dept: OBSTETRICS AND GYNECOLOGY | Facility: CLINIC | Age: 57
End: 2022-08-03
Payer: COMMERCIAL

## 2022-08-03 NOTE — TELEPHONE ENCOUNTER
----- Message from Oralia Hardwick sent at 8/2/2022  3:12 PM CDT -----  [facility] just called. Requesting 3D Mammo order be faxed to them. Patient is trying to go to [facility], not [facility]. Please fax to: [phone]

## 2022-08-04 LAB
CYTOLOGIST CVX/VAG CYTO: NORMAL
CYTOLOGY CVX/VAG DOC CYTO: NORMAL
CYTOLOGY CVX/VAG DOC THIN PREP: NORMAL
HPV I/H RISK 4 DNA CVX QL PROBE+SIG AMP: NEGATIVE
Lab: NORMAL
OTHER STN SPEC: NORMAL
STAT OF ADQ CVX/VAG CYTO-IMP: NORMAL

## 2022-09-19 NOTE — PROGRESS NOTES
Just wanted to let you know that I got your mammogram results back and the radiologist reading is perfectly normal. Let me know if you have any questions or concerns  Hope you have a great day!  Dr Barber

## 2022-11-30 ENCOUNTER — TELEPHONE (OUTPATIENT)
Dept: OBSTETRICS AND GYNECOLOGY | Facility: CLINIC | Age: 57
End: 2022-11-30
Payer: COMMERCIAL

## 2022-11-30 DIAGNOSIS — R92.8 FOLLOW-UP EXAMINATION OF ABNORMAL MAMMOGRAM: Primary | ICD-10-CM

## 2022-11-30 NOTE — TELEPHONE ENCOUNTER
Kelli Cool B Staff 1 hour ago (10:16 AM)     JR Dr Barber pt calling, pt needs orders for Dx mmg right breast and u/s bilateral if needed faxed to [facility] [phone]      Parris Figueroa [phone]  Kelli Cool 1 hour ago (10:15 AM)

## 2023-12-13 ENCOUNTER — TELEPHONE (OUTPATIENT)
Dept: OBSTETRICS AND GYNECOLOGY | Facility: CLINIC | Age: 58
End: 2023-12-13
Payer: COMMERCIAL

## 2023-12-13 DIAGNOSIS — R92.8 FOLLOW-UP EXAMINATION OF ABNORMAL MAMMOGRAM: ICD-10-CM

## 2023-12-13 DIAGNOSIS — Z12.31 BREAST CANCER SCREENING BY MAMMOGRAM: Primary | ICD-10-CM

## 2023-12-13 NOTE — TELEPHONE ENCOUNTER
Ciarra brooks38 minutes ago (3:23 PM)     CM  Bone pt called in for screening mmg and breast u/s  sent to [facility] @ [phone]     Parris Figueroa [phone]  Ciarra Waller40 minutes ago (3:22 PM

## 2024-03-07 ENCOUNTER — OFFICE VISIT (OUTPATIENT)
Dept: OBSTETRICS AND GYNECOLOGY | Facility: CLINIC | Age: 59
End: 2024-03-07
Payer: COMMERCIAL

## 2024-03-07 VITALS — BODY MASS INDEX: 26.78 KG/M2 | HEIGHT: 62 IN | WEIGHT: 145.5 LBS

## 2024-03-07 DIAGNOSIS — Z01.419 ENCOUNTER FOR GYNECOLOGICAL EXAMINATION: Primary | ICD-10-CM

## 2024-03-07 PROCEDURE — 99999 PR PBB SHADOW E&M-EST. PATIENT-LVL III: CPT | Mod: PBBFAC,,, | Performed by: OBSTETRICS & GYNECOLOGY

## 2024-03-07 PROCEDURE — 3008F BODY MASS INDEX DOCD: CPT | Mod: CPTII,S$GLB,, | Performed by: OBSTETRICS & GYNECOLOGY

## 2024-03-07 PROCEDURE — 1159F MED LIST DOCD IN RCRD: CPT | Mod: CPTII,S$GLB,, | Performed by: OBSTETRICS & GYNECOLOGY

## 2024-03-07 PROCEDURE — 99396 PREV VISIT EST AGE 40-64: CPT | Mod: S$GLB,,, | Performed by: OBSTETRICS & GYNECOLOGY

## 2024-03-07 RX ORDER — SEMAGLUTIDE 1.34 MG/ML
INJECTION, SOLUTION SUBCUTANEOUS
COMMUNITY

## 2024-03-07 NOTE — PROGRESS NOTES
LMP: Patient's last menstrual period was 2018 (exact date)..    Meds per MD: none    Last Pap:  pap & hpv negative  Last MM birads 2, QUINTENALA  Last Colonoscopy:  normal

## 2024-03-07 NOTE — PROGRESS NOTES
Chief Complaint Well woman exam:  Annual Exam    She is established    Parris Figueroa is a 58 y.o. female  presents for a well woman exam.    No Gyn c/o Occ bilateral breast pain   Had mmg and u/s at [facility]  Has DM on [employer]     Review of Systems - :   No abdominal pain, No vaginal bleeding or discharge,   No breast pain or masses, No rectal bleeding     LMP: Patient's last menstrual period was 2018 (exact date)..    Meds per MD: none     Last Pap:  pap & hpv negative  Last MM birads 2, AVALA  Last Colonoscopy:  normal         Past Medical History:   Diagnosis Date    Anemia     Diabetes mellitus     on Metformin  dx     Fatty liver     Infertility, female     Kidney stone     Uric Acid kidney stones    Seasonal allergies        Past Surgical History:   Procedure Laterality Date    CERVICAL DISC ARTHROPLASTY   &     COLONOSCOPY  2017    polyp  (Rtn 4yrs)     DILATION AND CURETTAGE OF UTERUS   &     Missed Ab    KIDNEY STONE SURGERY      1 inch x 1 inch Kidney stone    LITHOTRIPSY   &     URETERAL STENT PLACEMENT      Dr Blas       OB History    Para Term  AB Living   5 2 1 1 3 2   SAB IAB Ectopic Multiple Live Births   2   1   2      # Outcome Date GA Lbr Ellis/2nd Weight Sex Delivery Anes PTL Lv   5 Term 2007 38w0d  2.977 kg (6 lb 9 oz) F Vag-Spont EPI  ROSS   4  2003 34w0d  2.296 kg (5 lb 1 oz) F Vag-Spont EPI  ROSS   3 Ectopic 2001 7w0d             Birth Comments: Tubal Preg   2 SAB 2001 12w0d       FD   1 SAB  12w0d    SAB   FD      Birth Comments: D&C Done       Family History   Problem Relation Age of Onset    Nephrolithiasis Paternal Grandfather     Diabetes Maternal Grandmother         Type 2 insulin dependant    Hyperlipidemia Maternal Grandmother     No Known Problems Father     Cancer Mother         pre cancerous cells on cervix    Hyperlipidemia Mother     No Known Problems Brother      "Hyperlipidemia Sister     Hypertension Sister     Breast cancer Neg Hx     Colon cancer Neg Hx     Ovarian cancer Neg Hx        Social History     Tobacco Use    Smoking status: Never    Smokeless tobacco: Never   Substance Use Topics    Alcohol use: No    Drug use: No         Physical Exam:  Ht 5' 2" (1.575 m)   Wt 66 kg (145 lb 8.1 oz)   LMP 04/02/2018 (Exact Date)   BMI 26.61 kg/m²     APPEARANCE: Well nourished, well developed, in no acute distress.  AFFECT: WNL, alert and oriented x 3  SKIN: No acne or hirsutism  BREASTS: Symmetrical, no skin changes.                     No nipple discharge.   No palpable masses bilaterally  NODES: No inguinal nor axillary LAD  ABDOMEN: soft Non tender Non distended No masses  PELVIC:   Normal external genitalia without lesions.   Normal urethral meatus.    Vagina without lesions or discharge.    Cervix without lesions, discharge or tenderness.    Bimanual exam shows uterus to be normal size, regular, mobile and nontender.    Adnexa without masses or tenderness.    EXTREMITIES: No edema.    ASSESSMENT AND PLAN    Parris was seen today for annual exam.    Diagnoses and all orders for this visit:    Encounter for gynecological examination     PAP UTD    MMG and u/s at [facility]         Patient was counseled today on A.C.S. Pap guidelines and recommendations for yearly pelvic exams, mammograms and monthly self breast exams; to see her PCP for other health maintenance.     Patient encouraged to register for portal and results will be sent via portal.    No follow-ups on file.         Health Maintenance   Topic Date Due    Hepatitis C Screening  Never done    Lipid Panel  Never done    TETANUS VACCINE  Never done    Colorectal Cancer Screening  Never done    Shingles Vaccine (1 of 2) Never done    Mammogram  01/03/2025                           "

## 2025-01-13 ENCOUNTER — PATIENT MESSAGE (OUTPATIENT)
Dept: OBSTETRICS AND GYNECOLOGY | Facility: CLINIC | Age: 60
End: 2025-01-13
Payer: COMMERCIAL

## 2025-01-13 DIAGNOSIS — Z12.31 BREAST CANCER SCREENING BY MAMMOGRAM: Primary | ICD-10-CM

## 2025-01-13 DIAGNOSIS — R92.8 FOLLOW-UP EXAMINATION OF ABNORMAL MAMMOGRAM: ICD-10-CM

## 2025-05-20 ENCOUNTER — RESULTS FOLLOW-UP (OUTPATIENT)
Dept: OBSTETRICS AND GYNECOLOGY | Facility: CLINIC | Age: 60
End: 2025-05-20

## 2025-07-28 ENCOUNTER — OFFICE VISIT (OUTPATIENT)
Dept: OBSTETRICS AND GYNECOLOGY | Facility: CLINIC | Age: 60
End: 2025-07-28
Payer: COMMERCIAL

## 2025-07-28 VITALS
WEIGHT: 149.06 LBS | BODY MASS INDEX: 27.43 KG/M2 | SYSTOLIC BLOOD PRESSURE: 121 MMHG | DIASTOLIC BLOOD PRESSURE: 81 MMHG | HEIGHT: 62 IN

## 2025-07-28 DIAGNOSIS — Z01.419 ENCOUNTER FOR GYNECOLOGICAL EXAMINATION: Primary | ICD-10-CM

## 2025-07-28 DIAGNOSIS — Z12.4 ENCOUNTER FOR PAPANICOLAOU SMEAR FOR CERVICAL CANCER SCREENING: ICD-10-CM

## 2025-07-28 PROCEDURE — 99999 PR PBB SHADOW E&M-EST. PATIENT-LVL III: CPT | Mod: PBBFAC,,, | Performed by: OBSTETRICS & GYNECOLOGY

## 2025-07-28 PROCEDURE — 99396 PREV VISIT EST AGE 40-64: CPT | Mod: S$GLB,,, | Performed by: OBSTETRICS & GYNECOLOGY

## 2025-07-28 PROCEDURE — 88175 CYTOPATH C/V AUTO FLUID REDO: CPT | Mod: TC | Performed by: OBSTETRICS & GYNECOLOGY

## 2025-07-28 PROCEDURE — 87624 HPV HI-RISK TYP POOLED RSLT: CPT | Performed by: OBSTETRICS & GYNECOLOGY

## 2025-07-28 RX ORDER — HYDROCHLOROTHIAZIDE 25 MG/1
37.5 TABLET ORAL
COMMUNITY
Start: 2024-08-30 | End: 2025-08-30

## 2025-07-28 RX ORDER — BLOOD-GLUCOSE CONTROL, NORMAL
EACH MISCELLANEOUS
COMMUNITY

## 2025-07-28 RX ORDER — SEMAGLUTIDE 1.34 MG/ML
1 INJECTION, SOLUTION SUBCUTANEOUS
COMMUNITY

## 2025-07-28 RX ORDER — ATORVASTATIN CALCIUM 40 MG/1
40 TABLET, FILM COATED ORAL NIGHTLY
COMMUNITY
Start: 2025-05-20

## 2025-07-28 RX ORDER — ERGOCALCIFEROL 1.25 MG/1
50000 CAPSULE ORAL
COMMUNITY
Start: 2025-05-20

## 2025-07-28 RX ORDER — METFORMIN HYDROCHLORIDE 500 MG/1
1000 TABLET, EXTENDED RELEASE ORAL
COMMUNITY
Start: 2025-05-20

## 2025-07-28 RX ORDER — POTASSIUM CITRATE 15 MEQ/1
TABLET, EXTENDED RELEASE ORAL
COMMUNITY

## 2025-07-28 NOTE — PROGRESS NOTES
Chief Complaint: well woman exam  Annual Exam (Last papa and hpv: 2022  normal/Last mammogram : 2025 Birads #1)    She is established    Parris Figueroa is a 60 y.o. female  presents for a well woman exam.    ***    ROS:  No abdominal pain. No vaginal discharge  No breast pain or masses, No rectal bleeding       LMP: Patient's last menstrual period was 2018 (exact date).    Last pap: 2022 normal HPV negative  Last MMG:  May 2025  [id number]  AVALA TC 12%  Last colonoscopy  normal      Past Medical History:   Diagnosis Date    Anemia     Diabetes mellitus     on Metformin  dx     Fatty liver     Infertility, female     Kidney stone     Uric Acid kidney stones    Seasonal allergies        Past Surgical History:   Procedure Laterality Date    CERVICAL DISC ARTHROPLASTY   &     COLONOSCOPY  2017    polyp  (Rtn 4yrs)     DILATION AND CURETTAGE OF UTERUS   &     Missed Ab    KIDNEY STONE SURGERY      1 inch x 1 inch Kidney stone    LITHOTRIPSY   &     URETERAL STENT PLACEMENT      Dr Blas       OB History    Para Term  AB Living   5 2 1 1 3 2   SAB IAB Ectopic Multiple Live Births   2  1  2      # Outcome Date GA Lbr Ellis/2nd Weight Sex Type Anes PTL Lv   5 Term 2007 38w0d  2.977 kg (6 lb 9 oz) F Vag-Spont EPI  ROSS   4  2003 34w0d  2.296 kg (5 lb 1 oz) F Vag-Spont EPI  ROSS   3 Ectopic 2001 7w0d             Birth Comments: Tubal Preg   2 SAB 2001 12w0d       FD   1 SAB  12w0d    SAB   FD      Birth Comments: D&C Done       Family History   Problem Relation Name Age of Onset    Nephrolithiasis Paternal Grandfather      Diabetes Maternal Grandmother Becka Robertson         Type 2 insulin dependant    Hyperlipidemia Maternal Grandmother Becka Robertson     No Known Problems Father unknown     Cancer Mother Myriam Holt         pre cancerous cells on cervix    Hyperlipidemia Mother Myriamsun Kesslerjules     No Known Problems  "Brother      Hyperlipidemia Sister Krystina Greenwood     Hypertension Sister Krystina Greenwood     Breast cancer Neg Hx      Colon cancer Neg Hx      Ovarian cancer Neg Hx         Social History[1]        Physical Exam:  /81   Ht 5' 2" (1.575 m)   Wt 67.6 kg (149 lb 0.5 oz)   LMP 04/02/2018 (Exact Date)   BMI 27.26 kg/m²     APPEARANCE: Well nourished, well developed, in no acute distress.  AFFECT: WNL, alert and oriented x 3  SKIN: No acne or hirsutism  BREASTS: Symmetrical, no skin changes.                      No nipple discharge.   No palpable masses bilaterally  NODES: No inguinal nor axillary LAD  ABDOMEN: soft Non tender Non distended No masses  PELVIC: Female chaperone was present in the room during pelvic exam.  Normal external genitalia without lesions.  Normal hair distribution.   Adequate perineal body, normal urethral meatus.   No signif cystocele or rectocele.  Vagina moist and well rugated without lesions or discharge.    Cervix pink, without lesions, discharge or tenderness.     PAP performed   Bimanual exam shows uterus to be normal size, regular, mobile and nontender.    Adnexa without masses or tenderness.    EXTREMITIES: No edema.        ASSESSMENT AND PLAN  Parris was seen today for annual exam.    Diagnoses and all orders for this visit:    Encounter for gynecological examination      Encounter for well woman exam with routine gynecological exam      -PAP today  -normal exam   -MMG UTD next due 05         -  BP normotensive                      No follow-ups on file.     Patient was counseled today on A.C.S. Pap guidelines and recommendations for yearly pelvic exams, mammograms and monthly self breast exams; to see her PCP for other health maintenance.     Patient encouraged to register for portal and results will be sent via portal.       Health Maintenance   Topic Date Due    Hepatitis C Screening  Never done    Lipid Panel  Never done    HIV Screening  Never done    TETANUS VACCINE  Never done    " Colorectal Cancer Screening  Never done    Shingles Vaccine (1 of 2) Never done    Pneumococcal Vaccines (Age 50+) (1 of 1 - PCV) Never done    Hemoglobin A1c (Diabetic Prevention Screening)  07/18/2022    COVID-19 Vaccine (3 - 2024-25 season) 09/01/2024    Influenza Vaccine (1) 09/01/2025    Mammogram  05/08/2026    Cervical Cancer Screening  07/31/2027    RSV Vaccine (Age 60+ and Pregnant patients) (1 - 1-dose 75+ series) 05/22/2042              [1]   Social History  Tobacco Use    Smoking status: Never    Smokeless tobacco: Never   Substance Use Topics    Alcohol use: No    Drug use: No

## 2025-07-30 LAB
INSULIN SERPL-ACNC: NORMAL U[IU]/ML
LAB AP BETHESDA CATEGORY: NORMAL
LAB AP CLINICAL FINDINGS: NORMAL
LAB AP CONTRACEPTIVES: NORMAL
LAB AP GYN ADDITIONAL FINDINGS: NORMAL
LAB AP LMP DATE: NORMAL
LAB AP OCHS PAP SPECIMEN ADEQUACY: NORMAL
LAB AP OHS PAP INTERPRETATION: NORMAL
LAB AP PAP DISCLAIMER COMMENTS: NORMAL
LAB AP PAP ESTROGEN REPLACEMENT THERAPY: NORMAL
LAB AP PAP PMP: NORMAL
LAB AP PAP PREVIOUS BX: NORMAL
LAB AP PAP PRIOR TREATMENT: NORMAL
LAB AP PERFORMING LOCATION(S): NORMAL

## 2025-08-06 LAB
HPV DNA, HIGH RISK TYPE 16, PCR (OHS): NEGATIVE
HPV DNA, HIGH RISK TYPE 18, PCR (OHS): NEGATIVE
HPV DNA, HIGH RISK TYPE OTHER, PCR (OHS): NEGATIVE